# Patient Record
Sex: MALE | Race: BLACK OR AFRICAN AMERICAN | NOT HISPANIC OR LATINO
[De-identification: names, ages, dates, MRNs, and addresses within clinical notes are randomized per-mention and may not be internally consistent; named-entity substitution may affect disease eponyms.]

---

## 2021-01-25 PROBLEM — Z00.00 ENCOUNTER FOR PREVENTIVE HEALTH EXAMINATION: Status: ACTIVE | Noted: 2021-01-25

## 2021-01-26 ENCOUNTER — APPOINTMENT (OUTPATIENT)
Dept: OTOLARYNGOLOGY | Facility: CLINIC | Age: 26
End: 2021-01-26
Payer: COMMERCIAL

## 2021-01-26 VITALS
BODY MASS INDEX: 30.36 KG/M2 | HEART RATE: 76 BPM | DIASTOLIC BLOOD PRESSURE: 93 MMHG | SYSTOLIC BLOOD PRESSURE: 143 MMHG | WEIGHT: 205 LBS | HEIGHT: 69 IN

## 2021-01-26 DIAGNOSIS — R51.9 HEADACHE, UNSPECIFIED: ICD-10-CM

## 2021-01-26 DIAGNOSIS — R93.0 ABNORMAL FINDINGS ON DIAGNOSTIC IMAGING OF SKULL AND HEAD, NOT ELSEWHERE CLASSIFIED: ICD-10-CM

## 2021-01-26 PROCEDURE — 99072 ADDL SUPL MATRL&STAF TM PHE: CPT

## 2021-01-26 PROCEDURE — 99204 OFFICE O/P NEW MOD 45 MIN: CPT | Mod: 25

## 2021-01-26 PROCEDURE — 31231 NASAL ENDOSCOPY DX: CPT

## 2021-01-26 NOTE — DATA REVIEWED
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  Report reviewed unable to see films.

## 2021-01-26 NOTE — REASON FOR VISIT
[Initial Consultation] : an initial consultation for [FreeTextEntry2] : possible sinus infection. [FreeTextEntry1] : Referred by [facility] [place] [facility] Dr. Monahan

## 2021-01-26 NOTE — PHYSICAL EXAM
[Nasal Endoscopy Performed] : nasal endoscopy was performed, see procedure section for findings [] : septum deviated bilaterally [Normal] : mucosa is normal

## 2021-01-26 NOTE — HISTORY OF PRESENT ILLNESS
[de-identified] : 25-year-old male presented with a history of having seen by neurologist and referred to ENT.  Has  history of severe headaches frontal as well in the back of his head not related to any type of purulent nasal drainage.  Does have some clear drainage in the morning no history of difficulty breathing through his nose but does get occasional congestion.  Has not had any purulent sinusitis.  Undergone work-up from the neurologist for severe headaches affecting his eyeballs back of the head and forehead.  Findings were suspicious of migraine/cluster headaches.  Underwent an MRI which showed abnormal sinus disease thus the referral to ENT.  Being followed by neurologist getting some injections for the pain

## 2021-01-26 NOTE — CONSULT LETTER
[Dear  ___] : Dear  [unfilled], [Consult Letter:] : I had the pleasure of evaluating your patient, [unfilled]. [Please see my note below.] : Please see my note below. [Consult Closing:] : Thank you very much for allowing me to participate in the care of this patient.  If you have any questions, please do not hesitate to contact me. [Sincerely,] : Sincerely, [FreeTextEntry3] : Erik Pinto MD, FACS \par  of Otolaryngology  \par [facility] \par [address] \[place] [address] \par Phone: [phone] \par Fax: [phone] \par \par

## 2021-01-26 NOTE — REVIEW OF SYSTEMS
[Ear Pain] : ear pain [Ear Itch] : ear itch [Lightheadedness] : lightheadedness [Sinus Pain] : sinus pain [Sinus Pressure] : sinus pressure [Eye Pain] : eye pain [Dizziness] : dizziness [Negative] : Heme/Lymph [Anxiety] : no anxiety [de-identified] : Headaches

## 2022-02-04 ENCOUNTER — EMERGENCY (EMERGENCY)
Facility: HOSPITAL | Age: 27
LOS: 1 days | Discharge: HOME | End: 2022-02-04
Attending: EMERGENCY MEDICINE | Admitting: EMERGENCY MEDICINE
Payer: COMMERCIAL

## 2022-02-04 VITALS
TEMPERATURE: 98 F | RESPIRATION RATE: 17 BRPM | HEART RATE: 100 BPM | SYSTOLIC BLOOD PRESSURE: 155 MMHG | OXYGEN SATURATION: 100 % | DIASTOLIC BLOOD PRESSURE: 104 MMHG

## 2022-02-04 VITALS
HEART RATE: 100 BPM | DIASTOLIC BLOOD PRESSURE: 68 MMHG | OXYGEN SATURATION: 100 % | TEMPERATURE: 98 F | RESPIRATION RATE: 18 BRPM | SYSTOLIC BLOOD PRESSURE: 144 MMHG

## 2022-02-04 PROCEDURE — 99284 EMERGENCY DEPT VISIT MOD MDM: CPT

## 2022-02-04 RX ORDER — IBUPROFEN 200 MG
600 TABLET ORAL ONCE
Refills: 0 | Status: COMPLETED | OUTPATIENT
Start: 2022-02-04 | End: 2022-02-04

## 2022-02-04 RX ORDER — METOCLOPRAMIDE HCL 10 MG
10 TABLET ORAL ONCE
Refills: 0 | Status: COMPLETED | OUTPATIENT
Start: 2022-02-04 | End: 2022-02-04

## 2022-02-04 RX ORDER — ACETAMINOPHEN 500 MG
975 TABLET ORAL ONCE
Refills: 0 | Status: COMPLETED | OUTPATIENT
Start: 2022-02-04 | End: 2022-02-04

## 2022-02-04 RX ORDER — MORPHINE SULFATE 50 MG/1
4 CAPSULE, EXTENDED RELEASE ORAL ONCE
Refills: 0 | Status: DISCONTINUED | OUTPATIENT
Start: 2022-02-04 | End: 2022-02-04

## 2022-02-04 RX ORDER — OXYCODONE HYDROCHLORIDE 5 MG/1
1 TABLET ORAL
Qty: 12 | Refills: 0
Start: 2022-02-04 | End: 2022-02-06

## 2022-02-04 RX ORDER — LIDOCAINE 4 G/100G
1 CREAM TOPICAL ONCE
Refills: 0 | Status: COMPLETED | OUTPATIENT
Start: 2022-02-04 | End: 2022-02-04

## 2022-02-04 RX ORDER — OXYCODONE HYDROCHLORIDE 5 MG/1
5 TABLET ORAL ONCE
Refills: 0 | Status: DISCONTINUED | OUTPATIENT
Start: 2022-02-04 | End: 2022-02-04

## 2022-02-04 RX ADMIN — Medication 10 MILLIGRAM(S): at 18:51

## 2022-02-04 RX ADMIN — LIDOCAINE 1 PATCH: 4 CREAM TOPICAL at 15:50

## 2022-02-04 RX ADMIN — Medication 975 MILLIGRAM(S): at 15:50

## 2022-02-04 RX ADMIN — OXYCODONE HYDROCHLORIDE 5 MILLIGRAM(S): 5 TABLET ORAL at 15:50

## 2022-02-04 RX ADMIN — MORPHINE SULFATE 4 MILLIGRAM(S): 50 CAPSULE, EXTENDED RELEASE ORAL at 18:51

## 2022-02-04 RX ADMIN — Medication 600 MILLIGRAM(S): at 16:12

## 2022-02-04 NOTE — ED PROVIDER NOTE - CLINICAL SUMMARY MEDICAL DECISION MAKING FREE TEXT BOX
25yo M w/ no significant pmh coming in w/ worsening atraumatic back pain since yesterday; no corcern for cord compression. Will treat symptomatically and reassess

## 2022-02-04 NOTE — ED ADULT NURSE REASSESSMENT NOTE - NS ED NURSE REASSESS COMMENT FT1
pt alert,oriented x3. reports headache strted  followed by back pains, states back pain affected by mvt. requesting to  speak with md. md informed. will continue to monitor

## 2022-02-04 NOTE — ED PROVIDER NOTE - PROGRESS NOTE DETAILS
DD ED ATTG:  still having pain, appears more comfortable but reports not able to walk.  Also reports HA.  Pt endorses gets HA from time to time, sees neuro, had CT Ciaran, PGY2: Patient reassessed after medications and is noting symptomatic relief. Patient able to stand and walk. Informed patient that pain will take a bit to completely resolve and recommended outpatient spine follow up. Patient is agreeable and feels comfortable for discharge. Strict return precautions provided.

## 2022-02-04 NOTE — ED PROVIDER NOTE - OBJECTIVE STATEMENT
25yo M w/ no significant pmh coming in w/ worsening atraumatic back pain since yesterday. 25yo M w/ no significant pmh coming in w/ worsening atraumatic back pain since yesterday. Denies any urinary or bowel incontinence, any fevers/chills or loss of sensation to the lower extremities. Took tylenol last night w/ minimal relief.

## 2022-02-04 NOTE — ED PROVIDER NOTE - ATTENDING CONTRIBUTION TO CARE
26M p/w low back pain x 1 day, unable to walk, "severe" "unbearable".  No injury or inciting event pt can recall.  Pain is better at rest.  Pt tried tylenol last night w/o much improvement.  Pain located low back, diffuse across the midline.  Aching pain.  No urinary or fecal incontinence.  Pt reports both legs have numbness.  PMHX deneis  PSHX denies no meds.  (+)T no drugs.  Pt also reports has chronic HA, sees neuro, apparently had an abnormal head CT and was recc to get MRI but hasn't yet.  Pt mild distress at rest, worse upon movement, hip flexion.  Able to turn over in bed.  No midline spinal ttp or deformity.  Some PVM spasm.  Distal feet motor strength 5/5, no sensory def.  Normal neuro exam.  Plan rx pain meds, reass.  Trial of ambulation.    VS:  unremarkable except HTN    GEN - mild-mod distress back pain, worse with movement   A+O x3   HEAD - NC/AT     ENT - PEERL, EOMI, mucous membranes    moist , no discharge      NECK: Neck supple, non-tender without lymphadenopathy, no masses, no JVD  PULM - CTA b/l,  symmetric breath sounds  COR -  normal heart sounds    ABD - , ND, NT, soft,  BACK - no CVA tenderness, nontender spine   No focal midline ttp of L-spine.  No skin change or deformity.  (+)PVM spasm bilat lumbar area.    EXTREMS - no edema, no deformity, warm and well perfused    SKIN - no rash    or bruising      NEUROLOGIC - alert, face symmetric, speech fluent, sensation nl, motor no focal deficit.

## 2022-02-04 NOTE — ED PROVIDER NOTE - NSFOLLOWUPINSTRUCTIONS_ED_ALL_ED_FT
Discharge instructions:    - Please follow up with your Primary Care Doctor within the next 3-5 days.    - We recommend you follow up with the [facility]ine [facility].     - Tylenol up to 650 mg every 8 hours as needed for pain and/or Motrin up to 600 mg every 6 hours as needed for pain. Take any prescribed medications as instructed:         SEEK IMMEDIATE MEDICAL CARE IF YOU HAVE ANY OF THE FOLLOWING SYMPTOMS: bowel or bladder control problems, unusual weakness or numbness in your arms or legs, nausea or vomiting, abdominal pain, fever, dizziness/lightheadedness.

## 2022-02-04 NOTE — ED PROVIDER NOTE - PHYSICAL EXAMINATION
GENERAL: well appearing in no acute distress, non-toxic appearing  HEAD: normocephalic, atraumatic  HENT: airway intact, neck supple  EYES: normal conjunctiva  CARDIAC: regular rate and rhythm, normal S1S2, no appreciable murmurs, 2+ pulses in UE/LE b/l  PULM: normal breath sounds, clear to ascultation bilaterally, no rales, rhonchi, wheezing  GI: abdomen nondistended, soft, nontender, no guarding, rebound tenderness  NEURO: no focal motor or sensory deficits, CN2-12 intact, normal speech, AAOx3  MSK: lumbar paraspinal TTP; 5/5 strength in lower extremities; sensation intact, intact proprioception   SKIN: well-perfused, extremities warm  PSYCH: appropriate mood and affect

## 2022-02-04 NOTE — ED ADULT NURSE REASSESSMENT NOTE - NS ED NURSE REASSESS COMMENT FT1
pt remains alert,oriented  x 3. iv access,meds as ordered c/o headache,back pains after reevaluation by md. denies n,v.. back pains increases with mvt. will continue to monitor

## 2022-02-04 NOTE — ED PROVIDER NOTE - PATIENT PORTAL LINK FT
You can access the FollowMyHealth Patient Portal offered by [facility] by registering at the following website: [email or web address]. By joining [employer]’s FollowMyHealth portal, you will also be able to view your health information using other applications (apps) compatible with our system.

## 2022-02-04 NOTE — ED PROVIDER NOTE - NS ED ROS FT
General: denies fever, chills  HENT: denies nasal congestion, rhinorrhea  Eyes: denies visual changes, blurred vision  CV: denies chest pain, palpitations  Resp: denies difficulty breathing, cough  Abdominal: denies nausea, vomiting, diarrhea, abdominal pain  : denies urinary pain or discharge  MSK: lower back pain  Neuro: denies headaches, numbness, tingling  Skin: denies rashes, bruises

## 2024-04-02 ENCOUNTER — EMERGENCY (EMERGENCY)
Facility: HOSPITAL | Age: 29
LOS: 1 days | Discharge: HOME | End: 2024-04-02
Attending: EMERGENCY MEDICINE | Admitting: STUDENT IN AN ORGANIZED HEALTH CARE EDUCATION/TRAINING PROGRAM
Payer: COMMERCIAL

## 2024-04-02 VITALS
TEMPERATURE: 98 F | DIASTOLIC BLOOD PRESSURE: 77 MMHG | OXYGEN SATURATION: 99 % | RESPIRATION RATE: 18 BRPM | HEART RATE: 99 BPM | SYSTOLIC BLOOD PRESSURE: 126 MMHG

## 2024-04-02 DIAGNOSIS — Z98.890 OTHER SPECIFIED POSTPROCEDURAL STATES: Chronic | ICD-10-CM

## 2024-04-02 LAB
ALBUMIN SERPL ELPH-MCNC: 4.2 G/DL — SIGNIFICANT CHANGE UP (ref 3.3–5)
ALP SERPL-CCNC: 68 U/L — SIGNIFICANT CHANGE UP (ref 40–120)
ALT FLD-CCNC: 29 U/L — SIGNIFICANT CHANGE UP (ref 4–41)
ANION GAP SERPL CALC-SCNC: 13 MMOL/L — SIGNIFICANT CHANGE UP (ref 7–14)
AST SERPL-CCNC: 19 U/L — SIGNIFICANT CHANGE UP (ref 4–40)
BASE EXCESS BLDV CALC-SCNC: 3.7 MMOL/L — HIGH (ref -2–3)
BASOPHILS # BLD AUTO: 0.03 K/UL — SIGNIFICANT CHANGE UP (ref 0–0.2)
BASOPHILS NFR BLD AUTO: 0.1 % — SIGNIFICANT CHANGE UP (ref 0–2)
BILIRUB SERPL-MCNC: 1.1 MG/DL — SIGNIFICANT CHANGE UP (ref 0.2–1.2)
BLOOD GAS VENOUS COMPREHENSIVE RESULT: SIGNIFICANT CHANGE UP
BUN SERPL-MCNC: 9 MG/DL — SIGNIFICANT CHANGE UP (ref 7–23)
CALCIUM SERPL-MCNC: 9.5 MG/DL — SIGNIFICANT CHANGE UP (ref 8.4–10.5)
CHLORIDE BLDV-SCNC: 99 MMOL/L — SIGNIFICANT CHANGE UP (ref 96–108)
CHLORIDE SERPL-SCNC: 100 MMOL/L — SIGNIFICANT CHANGE UP (ref 98–107)
CO2 BLDV-SCNC: 31.2 MMOL/L — HIGH (ref 22–26)
CO2 SERPL-SCNC: 25 MMOL/L — SIGNIFICANT CHANGE UP (ref 22–31)
CREAT SERPL-MCNC: 0.91 MG/DL — SIGNIFICANT CHANGE UP (ref 0.5–1.3)
EGFR: 118 ML/MIN/1.73M2 — SIGNIFICANT CHANGE UP
EOSINOPHIL # BLD AUTO: 0.15 K/UL — SIGNIFICANT CHANGE UP (ref 0–0.5)
EOSINOPHIL NFR BLD AUTO: 0.7 % — SIGNIFICANT CHANGE UP (ref 0–6)
GAS PNL BLDV: 134 MMOL/L — LOW (ref 136–145)
GAS PNL BLDV: SIGNIFICANT CHANGE UP
GLUCOSE BLDV-MCNC: 91 MG/DL — SIGNIFICANT CHANGE UP (ref 70–99)
GLUCOSE SERPL-MCNC: 82 MG/DL — SIGNIFICANT CHANGE UP (ref 70–99)
HCO3 BLDV-SCNC: 30 MMOL/L — HIGH (ref 22–29)
HCT VFR BLD CALC: 41.4 % — SIGNIFICANT CHANGE UP (ref 39–50)
HCT VFR BLDA CALC: 43 % — SIGNIFICANT CHANGE UP (ref 39–51)
HGB BLD CALC-MCNC: 14.2 G/DL — SIGNIFICANT CHANGE UP (ref 12.6–17.4)
HGB BLD-MCNC: 14.1 G/DL — SIGNIFICANT CHANGE UP (ref 13–17)
HIV 1+2 AB+HIV1 P24 AG SERPL QL IA: SIGNIFICANT CHANGE UP
IANC: 15.69 K/UL — HIGH (ref 1.8–7.4)
IMM GRANULOCYTES NFR BLD AUTO: 0.4 % — SIGNIFICANT CHANGE UP (ref 0–0.9)
LACTATE BLDV-MCNC: 2 MMOL/L — SIGNIFICANT CHANGE UP (ref 0.5–2)
LYMPHOCYTES # BLD AUTO: 16 % — SIGNIFICANT CHANGE UP (ref 13–44)
LYMPHOCYTES # BLD AUTO: 3.29 K/UL — SIGNIFICANT CHANGE UP (ref 1–3.3)
MCHC RBC-ENTMCNC: 29.1 PG — SIGNIFICANT CHANGE UP (ref 27–34)
MCHC RBC-ENTMCNC: 34.1 GM/DL — SIGNIFICANT CHANGE UP (ref 32–36)
MCV RBC AUTO: 85.5 FL — SIGNIFICANT CHANGE UP (ref 80–100)
MONOCYTES # BLD AUTO: 1.27 K/UL — HIGH (ref 0–0.9)
MONOCYTES NFR BLD AUTO: 6.2 % — SIGNIFICANT CHANGE UP (ref 2–14)
NEUTROPHILS # BLD AUTO: 15.69 K/UL — HIGH (ref 1.8–7.4)
NEUTROPHILS NFR BLD AUTO: 76.6 % — SIGNIFICANT CHANGE UP (ref 43–77)
NRBC # BLD: 0 /100 WBCS — SIGNIFICANT CHANGE UP (ref 0–0)
NRBC # FLD: 0 K/UL — SIGNIFICANT CHANGE UP (ref 0–0)
PCO2 BLDV: 49 MMHG — SIGNIFICANT CHANGE UP (ref 42–55)
PH BLDV: 7.39 — SIGNIFICANT CHANGE UP (ref 7.32–7.43)
PLATELET # BLD AUTO: 247 K/UL — SIGNIFICANT CHANGE UP (ref 150–400)
PO2 BLDV: <20 MMHG — LOW (ref 25–45)
POTASSIUM BLDV-SCNC: 3.3 MMOL/L — LOW (ref 3.5–5.1)
POTASSIUM SERPL-MCNC: 3.4 MMOL/L — LOW (ref 3.5–5.3)
POTASSIUM SERPL-SCNC: 3.4 MMOL/L — LOW (ref 3.5–5.3)
PROT SERPL-MCNC: 7.6 G/DL — SIGNIFICANT CHANGE UP (ref 6–8.3)
RBC # BLD: 4.84 M/UL — SIGNIFICANT CHANGE UP (ref 4.2–5.8)
RBC # FLD: 13.6 % — SIGNIFICANT CHANGE UP (ref 10.3–14.5)
SAO2 % BLDV: 31.2 % — LOW (ref 67–88)
SODIUM SERPL-SCNC: 138 MMOL/L — SIGNIFICANT CHANGE UP (ref 135–145)
WBC # BLD: 20.51 K/UL — HIGH (ref 3.8–10.5)
WBC # FLD AUTO: 20.51 K/UL — HIGH (ref 3.8–10.5)

## 2024-04-02 PROCEDURE — 99285 EMERGENCY DEPT VISIT HI MDM: CPT

## 2024-04-02 PROCEDURE — 74177 CT ABD & PELVIS W/CONTRAST: CPT | Mod: 26,MC

## 2024-04-02 RX ORDER — LIDOCAINE HYDROCHLORIDE AND EPINEPHRINE 10; 10 MG/ML; UG/ML
20 INJECTION, SOLUTION INFILTRATION; PERINEURAL ONCE
Refills: 0 | Status: COMPLETED | OUTPATIENT
Start: 2024-04-02 | End: 2024-04-02

## 2024-04-02 RX ORDER — KETOROLAC TROMETHAMINE 30 MG/ML
15 SYRINGE (ML) INJECTION ONCE
Refills: 0 | Status: DISCONTINUED | OUTPATIENT
Start: 2024-04-02 | End: 2024-04-02

## 2024-04-02 RX ORDER — ACETAMINOPHEN 500 MG
1000 TABLET ORAL ONCE
Refills: 0 | Status: COMPLETED | OUTPATIENT
Start: 2024-04-02 | End: 2024-04-02

## 2024-04-02 RX ORDER — HYDROMORPHONE HYDROCHLORIDE 2 MG/ML
1 INJECTION INTRAMUSCULAR; INTRAVENOUS; SUBCUTANEOUS ONCE
Refills: 0 | Status: DISCONTINUED | OUTPATIENT
Start: 2024-04-02 | End: 2024-04-02

## 2024-04-02 RX ADMIN — Medication 15 MILLIGRAM(S): at 20:19

## 2024-04-02 RX ADMIN — Medication 400 MILLIGRAM(S): at 20:19

## 2024-04-02 RX ADMIN — LIDOCAINE HYDROCHLORIDE AND EPINEPHRINE 20 MILLILITER(S): 10; 10 INJECTION, SOLUTION INFILTRATION; PERINEURAL at 23:04

## 2024-04-02 RX ADMIN — HYDROMORPHONE HYDROCHLORIDE 1 MILLIGRAM(S): 2 INJECTION INTRAMUSCULAR; INTRAVENOUS; SUBCUTANEOUS at 23:01

## 2024-04-02 RX ADMIN — Medication 100 MILLIGRAM(S): at 20:39

## 2024-04-02 NOTE — CONSULT NOTE ADULT - ASSESSMENT
28M w/ no PMH presents with perianal abscess, with low concern for sepsis or systemic infection.    -Incision and drainage to be done at bedside  -Packing to be placed, can be removed tomorrow by patient  -Wound cx to be sent  -Patient can f/u in clinic with attending cosigned to note    D/w Chief Resident on call    Polina Montalvo MD  PGY-3  A Team  28M w/ no PMH presents with perianal abscess, with low concern for sepsis or systemic infection.    -Incision and drainage done at bedside  -Packing placed, can be removed tomorrow by patient  -Wound cx sent, f/u results  -Multimodal pain control on discharge with Tylenol, Motrin, and Oxycodone  -Stool softener  -Patient can f/u in clinic with Dr. Hernandez    D/w Chief Resident on call    Polina Montalvo MD  PGY-3  A Team  28M w/ no PMH presents with perianal abscess, with low concern for sepsis or systemic infection.    -Incision and drainage done at bedside succesfully  -Packing placed, can be removed tomorrow by patient  -Wound cx sent, f/u results  -Multimodal pain control on discharge with Tylenol, Motrin, and Oxycodone  -Stool softener  -Patient can f/u in clinic with Dr. Hernandez    D/w Chief Resident on call    Polina Montalvo MD  PGY-3  A Team

## 2024-04-02 NOTE — ED PROVIDER NOTE - OBJECTIVE STATEMENT
28-year-old male with history of labral laminectomy  sent in by urgent care for possible I&D for anal abscess.  Patient states since 3 days ago he has been experiencing severe pain around the anus,  leading to the him having difficulty sitting.  He felt a bump near the anus.  Report fever to 102.4 at home.  Denies nausea vomiting, abdominal pain, chest pain, shortness of breath, headache.  Went to urgent care today and was told that he needs an I&D performed for possible anal abscess. 28-year-old male with history of labral laminectomy  sent in by urgent care for possible I&D for anal abscess.  Patient states since 3 days ago he has been experiencing severe pain around the anus,  leading to the him having difficulty sitting.  He felt a bump near the anus.  Report fever to 102.4 at home.  Denies nausea vomiting, abdominal pain, chest pain, shortness of breath, headache.  Went to urgent care today and was told that he needs an I&D performed for possible anal abscess. Patient notes anal sex around 1 month ago.  Would like to be tested for STDs.

## 2024-04-02 NOTE — ED PROVIDER NOTE - ATTENDING CONTRIBUTION TO CARE
chante: Patient 28-year-old man otherwise healthy denies any other past medical history.  Has had perirectal pain for at least the last 3 days.  Nothing recently inserted into the rectum.  Last time the intercourse was about a month ago.  No history of an abscess previously in a perirectal or rectal area went to urgent care today found that he had an abscess that needed an I&D most likely was sent to the emergency department at home he had fever of 102.4.    Denies nausea vomiting diarrhea abdominal pain shortness of breath.    Blood pressure 126/77 respiratory rate 18 heart rate 99 temperature oral 98  Pt alert and can phonate well  h at/nc  perrl, conj clear, sclera anicteric,  neck supple  abd soft no r/g/t  Rectum on left buttock there is an area of remarkable tenderness and fluctuance in the vicinity of the anus but on rectal exam it does not extend into the anus.  ext no edema no deformities  neueo awake, lucid normal gait moves all extremities with strength  psych normal affect    Patient will have a CAT scan.  Area seems to be quite large had fever at home and doing a rectal temp here having lab work and will need medications and would start antibiotics as well    I performed a history and physical exam of the patient and discussed their management with the resident and /or advanced care provider. I personally made/approved the management plan and take responsibility for the patient management. I reviewed the resident and /or ACP's note and agree with the documented findings and plan of care. My medical decison making and observations are found above.

## 2024-04-02 NOTE — PROCEDURE NOTE - ADDITIONAL PROCEDURE DETAILS
Consent obtained. 1% Lidocaine w/ epi 10cc used for local anesthetic. Using 18g needle, aspirated into area of maximal induration and fluctuation, yielding 5cc pus. We then made an elliptical incision directly onto this needle juanis, with free flow of pus. We blunty explored the cavity and broke up loculations which yielded further pus. Packing was placed into the wound. Patient tolerated well. Consent obtained. 1% Lidocaine w/ epi 10cc used for local anesthetic. Using 18g needle, aspirated into area of maximal induration and fluctuation, yielding 5cc pus. We then made an elliptical incision directly onto this needle juanis, with free flow of pus. We blunty explored the cavity and broke up loculations which yielded further pus. Packing was placed into the wound and dressing applied. Patient tolerated well.

## 2024-04-02 NOTE — ED PROVIDER NOTE - CLINICAL SUMMARY MEDICAL DECISION MAKING FREE TEXT BOX
Presentation concerning for anal rectal abscess with possible fistula.  Given subjective fever, will obtain rectal temperature.  Obtain labs, pain control. CT abdomen pelvis with IV contrast to further assess.  Will reassess, dispo pending workup. Presentation concerning for anal rectal abscess with possible fistula.  Given subjective fever, will obtain rectal temperature.  Obtain labs, pain control. CT abdomen pelvis with IV contrast to further assess.  Will reassess, dispo pending workup.    chante: Patient 28-year-old man otherwise healthy denies any other past medical history.  Has had perirectal pain for at least the last 3 days.  Nothing recently inserted into the rectum.  Last time the intercourse was about a month ago.  No history of an abscess previously in a perirectal or rectal area went to urgent care today found that he had an abscess that needed an I&D most likely was sent to the emergency department at home he had fever of 102.4.    Denies nausea vomiting diarrhea abdominal pain shortness of breath.    Blood pressure 126/77 respiratory rate 18 heart rate 99 temperature oral 98  Pt alert and can phonate well  h at/nc  perrl, conj clear, sclera anicteric,  neck supple  abd soft no r/g/t  Rectum on left buttock there is an area of remarkable tenderness and fluctuance in the vicinity of the anus but on rectal exam it does not extend into the anus.  ext no edema no deformities  neueo awake, lucid normal gait moves all extremities with strength  psych normal affect    Patient will have a CAT scan.  Area seems to be quite large had fever at home and doing a rectal temp here having lab work and will need medications and would start antibiotics as well

## 2024-04-02 NOTE — CONSULT NOTE ADULT - SUBJECTIVE AND OBJECTIVE BOX
HPI: 28M no PMH presents w/ 3d pain in the rectal region.    In the ED, HDS. Labs with WBC 21, Lactate 2. CT A/P w/ IV showing 4cm perianal abscess. The patient endorses he felt febrile at home but this resolved. Denies chills, nausea, vomiting. Endorses this has never happened before. Has not had drainage of pus or blood from below. Denies colonoscopy hx of hx of IBD, large weight loss.    PMH: As above    PSH: As above    Allergies: NKDA    Physical exam:    /73  SPO2 100 T 98.2 RR 17    General- NAD. Pleasant and responsive. Non toxic appearing  Lungs- Comfortable on room air  - Area of induration and swelling on inferior gluteal fold, L side. Tender to palpation. No drainage. No hemorrhoids noted, or ulceration    Imaging:    ACC: [id number] EXAM:  CT ABDOMEN AND PELVIS IC   ORDERED BY: DEXTER MACHADO     PROCEDURE DATE:  04/02/2024          INTERPRETATION:  CLINICAL INFORMATION: Rectal abscess.    COMPARISON: None.    CONTRAST/COMPLICATIONS:  IV Contrast: Omnipaque 350  90 cc administered   10 cc discarded  Oral Contrast: NONE  Complications: None reported at time of study completion    PROCEDURE:  CT of the Abdomen and Pelvis was performed.  Sagittal and coronal reformats were performed.    FINDINGS:  LOWER CHEST: Within normal limits.    LIVER: Within normal limits.  BILE DUCTS: Normal caliber.  GALLBLADDER: Within normal limits.  SPLEEN: Within normal limits.  PANCREAS: Within normal limits.  ADRENALS: Within normal limits.  KIDNEYS/URETERS: Within normal limits.    BLADDER: Within normal limits.  REPRODUCTIVE ORGANS: Prostate within normal limits.    BOWEL: No bowel obstruction. Appendix is normal.  PERITONEUM: No ascites.  VESSELS: Within normal limits.  RETROPERITONEUM/LYMPH NODES: A few nonenlarged clusters of lymph nodes   are seen in the mesentery, nonspecific  ABDOMINAL WALL: 4.2 x 2.6 x 4.0 cm collection within the left posterior   perianal region with surrounding inflammation. Tiny fat-containing   umbilical hernia.  BONES: Within normal limits.    IMPRESSION:    4.2 x 2.6 x 4.0 cm left perianal collection with surrounding   inflammation. More detailed evaluation with contrast-enhanced MRI may be   obtained, as clinically warranted, if no contraindications exist.    --- End of Report ---          RADHA STRINGER MD; Resident Radiologist  This document has been electronically signed.  LONG MESA MD; Attending Radiologist  This document has been electronically signed. Apr 2 2024  9:55PM

## 2024-04-02 NOTE — ED ADULT NURSE NOTE - OBJECTIVE STATEMENT
this is a 28 y/0 male complaining of pain in his buttocks. Pain is 9/10. Alert and oriented x 4, denies past medical history. Rectal temp is 102.8 Dr Vernon informed. No bleeding noted, , ? boil on the inside of his buttocks, non draining firm on palpation. IV initiated on left arm g 20, 1st set of blood culture drawn and sent to lab.

## 2024-04-02 NOTE — ED ADULT TRIAGE NOTE - CHIEF COMPLAINT QUOTE
Pt AOX4 c/o  rectal pain, was seen in urgi-care told he has an abscess and to go to ER for I&D, no bleeding  Hx of abscesses under his arm but has never needed I&D   Hx of spinal decompression surgery

## 2024-04-02 NOTE — ED PROVIDER NOTE - PHYSICAL EXAMINATION
Vital signs reviewed.  CONSTITUTIONAL: NAD, awake  HEAD: Normocephalic; atraumatic  EYES: EOMI, no conjunctival injection, no scleral icterus  MOUTH/THROAT:  MMM  CV: Normal S1, S2; no audible murmurs; extremities WWP  RESP: normal work of breathing; CTAB   ABD: soft, non-distended; non-tender  : ***  MSK/EXT: no edema, no limited ROM  SKIN: No rashes on exposed skin surfaces  NEURO: Moves all extremities spontaneously with no focal deficits, speech is appropriate Vital signs reviewed.  CONSTITUTIONAL: NAD, awake  HEAD: Normocephalic; atraumatic  EYES: EOMI, no conjunctival injection, no scleral icterus  MOUTH/THROAT:  MMM  CV: Normal S1, S2; no audible murmurs; extremities WWP  RESP: normal work of breathing; CTAB   ABD: soft, non-distended; non-tender  : rectal exam showed approximately 5x5cm erythematous area postero-lateral to anus, indurated. White point near anus, possible fistula.   MSK/EXT: no edema, no limited ROM  SKIN: No rashes on exposed skin surfaces  NEURO: Moves all extremities spontaneously with no focal deficits, speech is appropriate

## 2024-04-03 VITALS
DIASTOLIC BLOOD PRESSURE: 81 MMHG | RESPIRATION RATE: 18 BRPM | HEART RATE: 83 BPM | SYSTOLIC BLOOD PRESSURE: 119 MMHG | OXYGEN SATURATION: 100 % | TEMPERATURE: 98 F

## 2024-04-03 LAB
ADD ON TEST-SPECIMEN IN LAB: SIGNIFICANT CHANGE UP
ALBUMIN SERPL ELPH-MCNC: 3.8 G/DL — SIGNIFICANT CHANGE UP (ref 3.3–5)
ALP SERPL-CCNC: 81 U/L — SIGNIFICANT CHANGE UP (ref 40–120)
ALT FLD-CCNC: 30 U/L — SIGNIFICANT CHANGE UP (ref 4–41)
ANION GAP SERPL CALC-SCNC: 19 MMOL/L — HIGH (ref 7–14)
AST SERPL-CCNC: 24 U/L — SIGNIFICANT CHANGE UP (ref 4–40)
BASOPHILS # BLD AUTO: 0.04 K/UL — SIGNIFICANT CHANGE UP (ref 0–0.2)
BASOPHILS NFR BLD AUTO: 0.2 % — SIGNIFICANT CHANGE UP (ref 0–2)
BILIRUB SERPL-MCNC: 0.7 MG/DL — SIGNIFICANT CHANGE UP (ref 0.2–1.2)
BUN SERPL-MCNC: 13 MG/DL — SIGNIFICANT CHANGE UP (ref 7–23)
CALCIUM SERPL-MCNC: 9.2 MG/DL — SIGNIFICANT CHANGE UP (ref 8.4–10.5)
CHLORIDE SERPL-SCNC: 98 MMOL/L — SIGNIFICANT CHANGE UP (ref 98–107)
CO2 SERPL-SCNC: 20 MMOL/L — LOW (ref 22–31)
CREAT SERPL-MCNC: 0.97 MG/DL — SIGNIFICANT CHANGE UP (ref 0.5–1.3)
EGFR: 110 ML/MIN/1.73M2 — SIGNIFICANT CHANGE UP
EOSINOPHIL # BLD AUTO: 0.29 K/UL — SIGNIFICANT CHANGE UP (ref 0–0.5)
EOSINOPHIL NFR BLD AUTO: 1.5 % — SIGNIFICANT CHANGE UP (ref 0–6)
GLUCOSE SERPL-MCNC: 107 MG/DL — HIGH (ref 70–99)
HAV IGM SER-ACNC: SIGNIFICANT CHANGE UP
HBV CORE IGM SER-ACNC: SIGNIFICANT CHANGE UP
HBV SURFACE AB SER-ACNC: REACTIVE
HBV SURFACE AG SER-ACNC: SIGNIFICANT CHANGE UP
HBV SURFACE AG SER-ACNC: SIGNIFICANT CHANGE UP
HCT VFR BLD CALC: 42.6 % — SIGNIFICANT CHANGE UP (ref 39–50)
HCV AB S/CO SERPL IA: 0.08 S/CO — SIGNIFICANT CHANGE UP (ref 0–0.99)
HCV AB SERPL-IMP: SIGNIFICANT CHANGE UP
HGB BLD-MCNC: 14 G/DL — SIGNIFICANT CHANGE UP (ref 13–17)
IANC: 14.51 K/UL — HIGH (ref 1.8–7.4)
IMM GRANULOCYTES NFR BLD AUTO: 0.5 % — SIGNIFICANT CHANGE UP (ref 0–0.9)
LYMPHOCYTES # BLD AUTO: 17 % — SIGNIFICANT CHANGE UP (ref 13–44)
LYMPHOCYTES # BLD AUTO: 3.35 K/UL — HIGH (ref 1–3.3)
MCHC RBC-ENTMCNC: 28.9 PG — SIGNIFICANT CHANGE UP (ref 27–34)
MCHC RBC-ENTMCNC: 32.9 GM/DL — SIGNIFICANT CHANGE UP (ref 32–36)
MCV RBC AUTO: 87.8 FL — SIGNIFICANT CHANGE UP (ref 80–100)
MONOCYTES # BLD AUTO: 1.42 K/UL — HIGH (ref 0–0.9)
MONOCYTES NFR BLD AUTO: 7.2 % — SIGNIFICANT CHANGE UP (ref 2–14)
NEUTROPHILS # BLD AUTO: 14.51 K/UL — HIGH (ref 1.8–7.4)
NEUTROPHILS NFR BLD AUTO: 73.6 % — SIGNIFICANT CHANGE UP (ref 43–77)
NRBC # BLD: 0 /100 WBCS — SIGNIFICANT CHANGE UP (ref 0–0)
NRBC # FLD: 0 K/UL — SIGNIFICANT CHANGE UP (ref 0–0)
PLATELET # BLD AUTO: 264 K/UL — SIGNIFICANT CHANGE UP (ref 150–400)
POTASSIUM SERPL-MCNC: 3.4 MMOL/L — LOW (ref 3.5–5.3)
POTASSIUM SERPL-SCNC: 3.4 MMOL/L — LOW (ref 3.5–5.3)
PROT SERPL-MCNC: 7.2 G/DL — SIGNIFICANT CHANGE UP (ref 6–8.3)
RBC # BLD: 4.85 M/UL — SIGNIFICANT CHANGE UP (ref 4.2–5.8)
RBC # FLD: 13.6 % — SIGNIFICANT CHANGE UP (ref 10.3–14.5)
SODIUM SERPL-SCNC: 137 MMOL/L — SIGNIFICANT CHANGE UP (ref 135–145)
WBC # BLD: 19.7 K/UL — HIGH (ref 3.8–10.5)
WBC # FLD AUTO: 19.7 K/UL — HIGH (ref 3.8–10.5)

## 2024-04-03 PROCEDURE — 99235 HOSP IP/OBS SAME DATE MOD 70: CPT

## 2024-04-03 RX ORDER — POTASSIUM CHLORIDE 20 MEQ
40 PACKET (EA) ORAL ONCE
Refills: 0 | Status: COMPLETED | OUTPATIENT
Start: 2024-04-03 | End: 2024-04-03

## 2024-04-03 RX ORDER — ACETAMINOPHEN 500 MG
975 TABLET ORAL ONCE
Refills: 0 | Status: COMPLETED | OUTPATIENT
Start: 2024-04-03 | End: 2024-04-03

## 2024-04-03 RX ORDER — KETOROLAC TROMETHAMINE 30 MG/ML
30 SYRINGE (ML) INJECTION ONCE
Refills: 0 | Status: DISCONTINUED | OUTPATIENT
Start: 2024-04-03 | End: 2024-04-03

## 2024-04-03 RX ADMIN — Medication 30 MILLIGRAM(S): at 06:29

## 2024-04-03 RX ADMIN — Medication 100 MILLIGRAM(S): at 05:13

## 2024-04-03 RX ADMIN — HYDROMORPHONE HYDROCHLORIDE 1 MILLIGRAM(S): 2 INJECTION INTRAMUSCULAR; INTRAVENOUS; SUBCUTANEOUS at 03:32

## 2024-04-03 RX ADMIN — Medication 1000 MILLIGRAM(S): at 03:32

## 2024-04-03 RX ADMIN — Medication 600 MILLIGRAM(S): at 03:32

## 2024-04-03 RX ADMIN — Medication 40 MILLIEQUIVALENT(S): at 05:12

## 2024-04-03 RX ADMIN — Medication 975 MILLIGRAM(S): at 02:56

## 2024-04-03 RX ADMIN — Medication 15 MILLIGRAM(S): at 03:32

## 2024-04-03 NOTE — ED CDU PROVIDER DISPOSITION NOTE - NSFOLLOWUPINSTRUCTIONS_ED_ALL_ED_FT
Please follow up with Dr. Hernandez.     You may remove packing in site later today.     Take Clindamycin 1 tablet every 8 hours for 10 days.    Take Motrin 600mg every 8hrs with food for pain    Take Tylenol 650mg every 4-6 hours as needed for pain.     Please return for any worsening pain, fever, vomiting, or any other new or concerning symptoms     Anorectal Abscess  An abscess is an infected area that contains pus. An anorectal abscess is an abscess that forms near the opening of the butt (anus) or around the rectum. If it is not treated, the abscess can grow and cause other problems. These problems may include a more severe body-wide infection or pain, especially when you poop.    What are the causes?  An anorectal abscess is caused by clogged glands or an infection in:  The anus.  The area between the anus and the scrotum in males or between the anus and the vagina in females (perineum).  What increases the risk?  You may be more likely to get this condition if:  You are pregnant.  You have diabetes or an inflammatory bowel disease, such as Crohn's disease.  You have had anal sex.  You have certain cancers. These include rectal cancer, leukemia, or lymphoma.  You have been given medicines to kill cancer cells (chemotherapy).  You have cracks in your anus (anal fissures). These cracks may form if you have constipation that lasts for a long time or does not go away.  You have a sexually transmitted infection (STI).  What are the signs or symptoms?  The main symptom of this condition is pain. It may be a throbbing pain that gets worse when you poop. Other symptoms include:  Swelling, redness, and pus near the anus. The redness may go beyond the abscess and look like a red streak on the skin.  A painful lump or tissue near the anus.  Bleeding or pus-like discharge from the area.  Fever or night sweats.  Weakness and tiredness (fatigue).  Constipation or pain when pooping.  Pain in the lower part of your stomach.  How is this diagnosed?  This condition is diagnosed based on your medical history and a physical exam. The exam may include:  A digital rectal exam. This is when your health care provider puts a gloved finger into your anus and rectum to look for problems.  A vaginal exam. This is when your provider looks at the vagina for problems.  Using a tube with a light and camera on the end (scope) to look at the rectum.  You may also need tests, such as an MRI or CT scan.    How is this treated?  Treatment for this condition may include:  Incision and drainage of the abscess. This is when an incision is made over the abscess to drain the pus.  Medicines. These may include pain medicines, medicine to help you poop (laxatives), stool softeners, or antibiotics.  Follow these instructions at home:  Medicines    Take over-the-counter and prescription medicines only as told by your provider.  If you were prescribed antibiotics, use them as told by your provider. Do not stop using the antibiotic even if you start to feel better.  Ask your provider if the medicine prescribed to you requires you to avoid driving or using machinery.  Wound care    A normal puncture site compared to an infected puncture site. The infected puncture site has swelling and redness.  Follow instructions from your provider about how to take care of your wound. Make sure you:  Wash your hands with soap and water for at least 20 seconds before and after you change your bandage (dressing). If soap and water are not available, use hand .  Change your dressing as told by your provider.  Leave stitches (sutures), skin glue, or tape strips in place. These skin closures may need to stay in place for 2 weeks or longer. If tape strip edges start to loosen and curl up, you may trim the loose edges. Do not remove tape strips completely unless your provider tells you to do that.  If one or more tubes (drains) were placed to drain the pus, be careful not to pull at them. Your provider will tell you how long they need to stay in place.  Check your abscess area every day for signs of infection. Check for:  More redness, swelling, or pain.  More fluid or blood.  Warmth.  Pus or a bad smell.  Managing pain, stiffness, and swelling    Bag of ice on a towel on the skin.  To help with pain, try sitting:  On a heating pad with the setting on low.  On an inflatable donut-shaped cushion.  If told, put ice on the affected area.  Put ice in a plastic bag.  Place a towel between your skin and the bag.  Leave the ice on for 20 minutes, 2–3 times a day.  If your skin turns bright red, remove the ice right away to prevent skin damage. The risk of damage is higher if you cannot feel pain, heat, or cold.  General instructions    Take a sitz bath 3–4 times a day and after you poop. A sitz bath is a shallow, warm water bath that you sit down in. The water should only come up to your hips and should cover your butt.  Follow instructions from your provider about what you may eat and drink.  Keep all follow-up visits. Your provider may need to remove stitches and make sure that the abscess has gone away.  Where to find more information  [employer] Rect[employer]: fascrs.org  Contact a health care provider if:  You have any signs of an infection.  You have a fever or chills.  You have trouble pooping.  Get help right away if:  You have trouble moving or using your legs.  You have severe pain or pain that gets worse.  You have swelling that gets worse.  You have a lot more bleeding or passing of pus.  This information is not intended to replace advice given to you by your health care provider. Make sure you discuss any questions you have with your health care provider.

## 2024-04-03 NOTE — ED CDU PROVIDER DISPOSITION NOTE - CLINICAL COURSE
28-year-old male with history of labral laminectomy  sent in by urgent care for possible I&D for anal abscess.  Patient states since 3 days ago he has been experiencing severe pain around the anus,  leading to the him having difficulty sitting.  He felt a bump near the anus.  Report fever to 102.4 at home.  Denies nausea vomiting, abdominal pain, chest pain, shortness of breath, headache.  Went to urgent care today and was told that he needs an I&D performed for possible anal abscess. Patient notes anal sex around 1 month ago.  Would like to be tested for STDs.  ctap shows 4.2 x 2.6 x 4.0 cm left perianal collection with surrounding inflammation. Pt started on clindamycin. General surgery performed bedside I&D. upon reassessment this morning, patient feels much improved. pain controlled. Feels ready for discharge. Down trend in Leukocytosis 20> 19. No fevers overnight. Spoke with Surg this morning, patient is cleared from there standpoint for outpatient follow up with Dr. Hernandez. patient is clinically well appearing and vitally stable for discharge home

## 2024-04-03 NOTE — ED CDU PROVIDER INITIAL DAY NOTE - PHYSICAL EXAMINATION
Vital signs reviewed.  CONSTITUTIONAL: NAD, awake  HEAD: Normocephalic; atraumatic  EYES: EOMI, no conjunctival injection, no scleral icterus  MOUTH/THROAT:  MMM  CV: Normal S1, S2; no audible murmurs; extremities WWP  RESP: normal work of breathing; CTAB   ABD: soft, non-distended; non-tender  : rectal exam showed approximately 5x5cm erythematous area postero-lateral to anus, indurated. White point near anus, possible fistula.   MSK/EXT: no edema, no limited ROM  SKIN: No rashes on exposed skin surfaces  NEURO: Moves all extremities spontaneously with no focal deficits, speech is appropriate

## 2024-04-03 NOTE — ED CDU PROVIDER INITIAL DAY NOTE - OBJECTIVE STATEMENT
28-year-old male with history of labral laminectomy  sent in by urgent care for possible I&D for anal abscess.  Patient states since 3 days ago he has been experiencing severe pain around the anus,  leading to the him having difficulty sitting.  He felt a bump near the anus.  Report fever to 102.4 at home.  Denies nausea vomiting, abdominal pain, chest pain, shortness of breath, headache.  Went to urgent care today and was told that he needs an I&D performed for possible anal abscess. Patient notes anal sex around 1 month ago.  Would like to be tested for STDs.  ctap shows 4.2 x 2.6 x 4.0 cm left perianal collection with surrounding inflammation. Pt started on clindamycin. General surgery performed bedside I&D. Plan to observe in CDU for pain control, continued IV abx and anti-pyretics prn.

## 2024-04-03 NOTE — ED CDU PROVIDER INITIAL DAY NOTE - CLINICAL SUMMARY MEDICAL DECISION MAKING FREE TEXT BOX
28-year-old male with history of labral laminectomy  sent in by urgent care for possible I&D for anal abscess.  Patient states since 3 days ago he has been experiencing severe pain around the anus,  leading to the him having difficulty sitting.  He felt a bump near the anus.  Report fever to 102.4 at home.  Denies nausea vomiting, abdominal pain, chest pain, shortness of breath, headache.  Went to urgent care today and was told that he needs an I&D performed for possible anal abscess. Patient notes anal sex around 1 month ago.  Would like to be tested for STDs.  ctap shows 4.2 x 2.6 x 4.0 cm left perianal collection with surrounding inflammation. Pt started on clindamycin. General surgery performed bedside I&D. Plan to observe in CDU for pain control, continued IV abx and anti-pyretics prn. 28-year-old male with history of labral laminectomy  sent in by urgent care for possible I&D for anal abscess.  Patient states since 3 days ago he has been experiencing severe pain around the anus,  leading to the him having difficulty sitting.  He felt a bump near the anus.  Report fever to 102.4 at home.  Denies nausea vomiting, abdominal pain, chest pain, shortness of breath, headache.  Went to urgent care today and was told that he needs an I&D performed for possible anal abscess. Patient notes anal sex around 1 month ago.  Would like to be tested for STDs.  ctap shows 4.2 x 2.6 x 4.0 cm left perianal collection with surrounding inflammation. Pt started on clindamycin. General surgery performed bedside I&D. Plan to observe in CDU for pain control, continued IV abx and anti-pyretics prn.    chante: Patient 28-year-old man otherwise healthy denies any other past medical history.  Has had perirectal pain for at least the last 3 days.  Nothing recently inserted into the rectum.  Last time the intercourse was about a month ago.  No history of an abscess previously in a perirectal or rectal area went to urgent care today found that he had an abscess that needed an I&D most likely was sent to the emergency department at home he had fever of 102.4.    Denies nausea vomiting diarrhea abdominal pain shortness of breath.    Blood pressure 126/77 respiratory rate 18 heart rate 99 temperature oral 98  Pt alert and can phonate well  h at/nc  perrl, conj clear, sclera anicteric,  neck supple  abd soft no r/g/t  Rectum on left buttock there is an area of remarkable tenderness and fluctuance in the vicinity of the anus but on rectal exam it does not extend into the anus.  ext no edema no deformities  neueo awake, lucid normal gait moves all extremities with strength  psych normal affect    Patient will have a CAT scan.  Area seems to be quite large had fever at home and doing a rectal temp here having lab work and will need medications and would start antibiotics as well

## 2024-04-03 NOTE — ED CDU PROVIDER DISPOSITION NOTE - PATIENT PORTAL LINK FT
You can access the FollowMyHealth Patient Portal offered by [facility] by registering at the following website: [email or web address]. By joining [employer]’s FollowMyHealth portal, you will also be able to view your health information using other applications (apps) compatible with our system.

## 2024-04-03 NOTE — ED CDU PROVIDER INITIAL DAY NOTE - ATTENDING APP SHARED VISIT CONTRIBUTION OF CARE
chante: Patient 28-year-old man otherwise healthy denies any other past medical history.  Has had perirectal pain for at least the last 3 days.  Nothing recently inserted into the rectum.  Last time the intercourse was about a month ago.  No history of an abscess previously in a perirectal or rectal area went to urgent care today found that he had an abscess that needed an I&D most likely was sent to the emergency department at home he had fever of 102.4.    Denies nausea vomiting diarrhea abdominal pain shortness of breath.    Blood pressure 126/77 respiratory rate 18 heart rate 99 temperature oral 98  Pt alert and can phonate well  h at/nc  perrl, conj clear, sclera anicteric,  neck supple  abd soft no r/g/t  Rectum on left buttock there is an area of remarkable tenderness and fluctuance in the vicinity of the anus but on rectal exam it does not extend into the anus.  ext no edema no deformities  neueo awake, lucid normal gait moves all extremities with strength  psych normal affect    Patient will have a CAT scan.  Area seems to be quite large had fever at home and doing a rectal temp here having lab work and will need medications and would start antibiotics as well

## 2024-04-03 NOTE — ED ADULT NURSE REASSESSMENT NOTE - NS ED NURSE REASSESS COMMENT FT1
Pt awake and alert, A&OX4, pt abscess sight looks clean and intact , no active bleeding noted. resp even and unlabored. Denies CP, SOB, HA, dizziness, palpitations, blurry vision. Resting comfortably.
pt remains a&ox4, c/o rectal pain. Pt denies chest pain, sob, n+v. Breathing even, unlabored. Pt medicated as per MAR. MD Montalvo at bedside

## 2024-04-03 NOTE — ED CDU PROVIDER DISPOSITION NOTE - CARE PROVIDER_API CALL
Bryan Hernandez  Surgery  [address] [place] [address], [address]  [place] [address]  Phone: [phone]  Fax: [phone]  Follow Up Time:

## 2024-04-03 NOTE — ED CDU PROVIDER DISPOSITION NOTE - ATTENDING CONTRIBUTION TO CARE
I (Peg) agree with above, I performed a history and physical. Counseled tiffani medical staff, physician assistant, and/or medical student on medical decision making as documented. Medical decisions and treatment interventions were made in real time during the patient encounter. Additionally and/or with the following exceptions:

## 2024-04-04 NOTE — ED POST DISCHARGE NOTE - REASON FOR FOLLOW-UP
Call from STEPHAN Olmedo to follow with patient regarding call she received on culture reuslts the gram stain. Pt is on clinda and improving. Is told to follow with Colorectal who did the I and D , states hes waiting for their call. I contacted the [place] [facility] and they will talk to patient and get them in touch to arrange follow up. Other

## 2024-04-08 LAB
CULTURE RESULTS: SIGNIFICANT CHANGE UP
CULTURE RESULTS: SIGNIFICANT CHANGE UP
SPECIMEN SOURCE: SIGNIFICANT CHANGE UP
SPECIMEN SOURCE: SIGNIFICANT CHANGE UP

## 2024-04-21 ENCOUNTER — EMERGENCY (EMERGENCY)
Facility: HOSPITAL | Age: 29
LOS: 1 days | Discharge: HOME | End: 2024-04-21
Attending: EMERGENCY MEDICINE | Admitting: EMERGENCY MEDICINE
Payer: COMMERCIAL

## 2024-04-21 VITALS
OXYGEN SATURATION: 98 % | RESPIRATION RATE: 17 BRPM | TEMPERATURE: 99 F | DIASTOLIC BLOOD PRESSURE: 94 MMHG | HEART RATE: 96 BPM | SYSTOLIC BLOOD PRESSURE: 153 MMHG

## 2024-04-21 DIAGNOSIS — Z98.890 OTHER SPECIFIED POSTPROCEDURAL STATES: Chronic | ICD-10-CM

## 2024-04-21 LAB
ALBUMIN SERPL ELPH-MCNC: 4.5 G/DL — SIGNIFICANT CHANGE UP (ref 3.3–5)
ALP SERPL-CCNC: 63 U/L — SIGNIFICANT CHANGE UP (ref 40–120)
ALT FLD-CCNC: 20 U/L — SIGNIFICANT CHANGE UP (ref 4–41)
ANION GAP SERPL CALC-SCNC: 12 MMOL/L — SIGNIFICANT CHANGE UP (ref 7–14)
APPEARANCE UR: CLEAR — SIGNIFICANT CHANGE UP
APTT BLD: 37.3 SEC — HIGH (ref 24.5–35.6)
AST SERPL-CCNC: 16 U/L — SIGNIFICANT CHANGE UP (ref 4–40)
BACTERIA # UR AUTO: ABNORMAL /HPF
BASOPHILS # BLD AUTO: 0.02 K/UL — SIGNIFICANT CHANGE UP (ref 0–0.2)
BASOPHILS NFR BLD AUTO: 0.1 % — SIGNIFICANT CHANGE UP (ref 0–2)
BILIRUB SERPL-MCNC: 0.7 MG/DL — SIGNIFICANT CHANGE UP (ref 0.2–1.2)
BILIRUB UR-MCNC: NEGATIVE — SIGNIFICANT CHANGE UP
BUN SERPL-MCNC: 11 MG/DL — SIGNIFICANT CHANGE UP (ref 7–23)
CALCIUM SERPL-MCNC: 9.4 MG/DL — SIGNIFICANT CHANGE UP (ref 8.4–10.5)
CAST: 0 /LPF — SIGNIFICANT CHANGE UP (ref 0–4)
CHLORIDE SERPL-SCNC: 104 MMOL/L — SIGNIFICANT CHANGE UP (ref 98–107)
CO2 SERPL-SCNC: 23 MMOL/L — SIGNIFICANT CHANGE UP (ref 22–31)
COLOR SPEC: YELLOW — SIGNIFICANT CHANGE UP
CREAT SERPL-MCNC: 0.84 MG/DL — SIGNIFICANT CHANGE UP (ref 0.5–1.3)
DIFF PNL FLD: NEGATIVE — SIGNIFICANT CHANGE UP
EGFR: 123 ML/MIN/1.73M2 — SIGNIFICANT CHANGE UP
EOSINOPHIL # BLD AUTO: 0.22 K/UL — SIGNIFICANT CHANGE UP (ref 0–0.5)
EOSINOPHIL NFR BLD AUTO: 1.4 % — SIGNIFICANT CHANGE UP (ref 0–6)
GLUCOSE SERPL-MCNC: 85 MG/DL — SIGNIFICANT CHANGE UP (ref 70–99)
GLUCOSE UR QL: NEGATIVE MG/DL — SIGNIFICANT CHANGE UP
HCT VFR BLD CALC: 42.7 % — SIGNIFICANT CHANGE UP (ref 39–50)
HGB BLD-MCNC: 14.2 G/DL — SIGNIFICANT CHANGE UP (ref 13–17)
IANC: 12.48 K/UL — HIGH (ref 1.8–7.4)
IMM GRANULOCYTES NFR BLD AUTO: 0.4 % — SIGNIFICANT CHANGE UP (ref 0–0.9)
INR BLD: 1.1 RATIO — SIGNIFICANT CHANGE UP (ref 0.85–1.18)
KETONES UR-MCNC: NEGATIVE MG/DL — SIGNIFICANT CHANGE UP
LACTATE SERPL-SCNC: 1.1 MMOL/L — SIGNIFICANT CHANGE UP (ref 0.5–2)
LEUKOCYTE ESTERASE UR-ACNC: ABNORMAL
LYMPHOCYTES # BLD AUTO: 14.9 % — SIGNIFICANT CHANGE UP (ref 13–44)
LYMPHOCYTES # BLD AUTO: 2.38 K/UL — SIGNIFICANT CHANGE UP (ref 1–3.3)
MCHC RBC-ENTMCNC: 28.2 PG — SIGNIFICANT CHANGE UP (ref 27–34)
MCHC RBC-ENTMCNC: 33.3 GM/DL — SIGNIFICANT CHANGE UP (ref 32–36)
MCV RBC AUTO: 84.9 FL — SIGNIFICANT CHANGE UP (ref 80–100)
MONOCYTES # BLD AUTO: 0.78 K/UL — SIGNIFICANT CHANGE UP (ref 0–0.9)
MONOCYTES NFR BLD AUTO: 4.9 % — SIGNIFICANT CHANGE UP (ref 2–14)
NEUTROPHILS # BLD AUTO: 12.48 K/UL — HIGH (ref 1.8–7.4)
NEUTROPHILS NFR BLD AUTO: 78.3 % — HIGH (ref 43–77)
NITRITE UR-MCNC: NEGATIVE — SIGNIFICANT CHANGE UP
NRBC # BLD: 0 /100 WBCS — SIGNIFICANT CHANGE UP (ref 0–0)
NRBC # FLD: 0 K/UL — SIGNIFICANT CHANGE UP (ref 0–0)
PH UR: 6 — SIGNIFICANT CHANGE UP (ref 5–8)
PLATELET # BLD AUTO: 264 K/UL — SIGNIFICANT CHANGE UP (ref 150–400)
POTASSIUM SERPL-MCNC: 3.8 MMOL/L — SIGNIFICANT CHANGE UP (ref 3.5–5.3)
POTASSIUM SERPL-SCNC: 3.8 MMOL/L — SIGNIFICANT CHANGE UP (ref 3.5–5.3)
PROT SERPL-MCNC: 7.7 G/DL — SIGNIFICANT CHANGE UP (ref 6–8.3)
PROT UR-MCNC: 30 MG/DL
PROTHROM AB SERPL-ACNC: 12.3 SEC — SIGNIFICANT CHANGE UP (ref 9.5–13)
RBC # BLD: 5.03 M/UL — SIGNIFICANT CHANGE UP (ref 4.2–5.8)
RBC # FLD: 13.9 % — SIGNIFICANT CHANGE UP (ref 10.3–14.5)
RBC CASTS # UR COMP ASSIST: 1 /HPF — SIGNIFICANT CHANGE UP (ref 0–4)
SODIUM SERPL-SCNC: 139 MMOL/L — SIGNIFICANT CHANGE UP (ref 135–145)
SP GR SPEC: 1.03 — SIGNIFICANT CHANGE UP (ref 1–1.03)
SQUAMOUS # UR AUTO: 1 /HPF — SIGNIFICANT CHANGE UP (ref 0–5)
UROBILINOGEN FLD QL: 1 MG/DL — SIGNIFICANT CHANGE UP (ref 0.2–1)
WBC # BLD: 15.94 K/UL — HIGH (ref 3.8–10.5)
WBC # FLD AUTO: 15.94 K/UL — HIGH (ref 3.8–10.5)
WBC UR QL: 8 /HPF — HIGH (ref 0–5)

## 2024-04-21 PROCEDURE — 74177 CT ABD & PELVIS W/CONTRAST: CPT | Mod: 26,MC

## 2024-04-21 PROCEDURE — 99285 EMERGENCY DEPT VISIT HI MDM: CPT

## 2024-04-21 RX ORDER — VANCOMYCIN HCL 1 G
1000 VIAL (EA) INTRAVENOUS ONCE
Refills: 0 | Status: COMPLETED | OUTPATIENT
Start: 2024-04-21 | End: 2024-04-21

## 2024-04-21 RX ORDER — KETOROLAC TROMETHAMINE 30 MG/ML
15 SYRINGE (ML) INJECTION ONCE
Refills: 0 | Status: DISCONTINUED | OUTPATIENT
Start: 2024-04-21 | End: 2024-04-21

## 2024-04-21 RX ORDER — PIPERACILLIN AND TAZOBACTAM 4; .5 G/20ML; G/20ML
3.38 INJECTION, POWDER, LYOPHILIZED, FOR SOLUTION INTRAVENOUS ONCE
Refills: 0 | Status: COMPLETED | OUTPATIENT
Start: 2024-04-21 | End: 2024-04-21

## 2024-04-21 RX ADMIN — Medication 15 MILLIGRAM(S): at 15:38

## 2024-04-21 RX ADMIN — Medication 250 MILLIGRAM(S): at 17:01

## 2024-04-21 RX ADMIN — PIPERACILLIN AND TAZOBACTAM 200 GRAM(S): 4; .5 INJECTION, POWDER, LYOPHILIZED, FOR SOLUTION INTRAVENOUS at 15:39

## 2024-04-21 NOTE — ED PROVIDER NOTE - PHYSICAL EXAMINATION
General: Alert and Orientated x 3. No apparent distress  Pulmonary: No increased WOB.   Abdominal: Soft, non-tender, non-distended  : Large perianal abscess draining pus  Neurologic: No focal sensory or motor deficits.  Skin: Color appropriate for race. Intact, warm, and well-perfused.  Psychiatric: Appropriate mood and affect. No apparent risk to self or others.

## 2024-04-21 NOTE — ED PROVIDER NOTE - CARE PROVIDER_API CALL
Bryan Hernandez  Surgery  [address] [place] [id number]  Phone: [phone]  Fax: [phone]  Follow Up Time:

## 2024-04-21 NOTE — ED PROVIDER NOTE - OBJECTIVE STATEMENT
27 y/o male recent CDU stay for perianal abscess presents with recurrence of abscess. He says after he got home the symptoms initially improved and he finished his course of clindamycin, but over the last week they have returned and become very painful. He believes the abscess began draining itself this afternoon when he got to the hospital. He denies fever/chills, chest pain, abdominal pain, dysuria/hematuria, n/v/d.

## 2024-04-21 NOTE — ED ADULT NURSE NOTE - CAS EDN DISCHARGE INTERVENTIONS
Final Anesthesia Post-op Assessment    Patient: Tracy Schwartz  Procedure(s) Performed: EXAM UNDER ANESTHESIA, PELVIS WITH BIOPSIES OF VULVA AND LOWER VAGINA  Anesthesia type: General    Vitals Value Taken Time   Temp 36.6 °C (97.8 °F) 12/2/2019  5:30 PM   Pulse 70 12/2/2019  5:30 PM   Resp 18 12/2/2019  5:30 PM   SpO2 98 % 12/2/2019  5:30 PM   /75 12/2/2019  5:30 PM       Last 24 I/O:     Intake/Output Summary (Last 24 hours) at 12/2/2019 1750  Last data filed at 12/2/2019 1734  Gross per 24 hour   Intake 525 ml   Output --   Net 525 ml         Patient Location: Phase II  Post-op Vital Signs:stable  Level of Consciousness: participates in exam, answers questions appropriately, awake, alert and oriented  Respiratory Status: spontaneous ventilation  Cardiovascular blood pressure returned to baseline and stable  Hydration: euvolemic  Pain Management: adequately controlled  Nausea: None  Airway Patency:patent  Post-op Assessment: awake, alert, appropriately conversant, or baseline, no complications, patient tolerated procedure well with no complications and evidence of recall       IV discontinued, cath removed intact

## 2024-04-21 NOTE — ED ADULT NURSE NOTE - OBJECTIVE STATEMENT
Break RN: Pt is a 27 y/o Male, A&Ox4, ambulatory with no reported PHx, presenting to the ED with c/o recurrence of perianal abscess. PT states he was recently discharged after treatment of abscess and finished course of antibiotics. Pt states abscess began draining while in waiting room. Neuro/sensory intact. Respirations even and unlabored, chest rise equal b/l. Pt denies chest pain, SOB, fever, cough, chills, abdominal pain, N/V/D, h/a, dizziness, numbness/tingling or any urinary symptoms at this time. No acute distress noted. 20g IVL placed in LAC. Labs collected and sent. Medication administered as ordered, see MAR. Safety maintained throughout.

## 2024-04-21 NOTE — ED PROVIDER NOTE - CLINICAL SUMMARY MEDICAL DECISION MAKING FREE TEXT BOX
29 y/o male recent CDU stay for perianal abscess presents with recurrence of abscess. He is hemodynamically stable, afebrile, on exam there is a large perianal abscess draining pus. Concern for recurrence/worsening of abscess, will get labs, give abx, CT AP to assess for internal spread of infection, consult surgery.

## 2024-04-21 NOTE — ED PROVIDER NOTE - NSFOLLOWUPINSTRUCTIONS_ED_ALL_ED_FT
Abscess    Your were seen in the Emergency Department today with a skin abscess.    A skin abscess is an infected area on or under your skin that contains a collection of pus and other material.  An abscess may also be called a furuncle, carbuncle, or boil.  It can occur on almost any part of your body.       Some abscesses open (rupture) on their own and drain.  Most continue to get worse unless they are treated.  The infection can spread deeper into the body and eventually into your blood, which can make you feel ill.  Treatment usually involves draining the abscess (and sometimes antibiotics).    General tips for taking care of an abscess:  -Take acetaminophen and/or ibuprofen for pain.  -If you were prescribed an antibiotic medicine, take it as told by your health care provider.  Do not stop taking the antibiotics even if you start to feel better.  -If you have an abscess that has not drained, apply heat to the affected area.  Use a moist heat pack or a heating pad.  Place a towel between your skin and the heat source, and try to leave the heat on for 20–30 minutes.  -If your abscess was drained, cover it with a bandage as instructed by your health care provider.  -Wash your hands with soap and water before you change the dressing or gauze.   -Check your abscess every day for signs of a worsening infection.   -To avoid spreading the infection: do not share personal care items, towels, or hot tubs with others, and avoid making skin contact with other people.    Follow up with your PCP in 1 week to make sure that you are doing better.    Return to the Emergency Department if you have:  -more redness, swelling, or pain around your abscess  -warm skin around your abscess  -more pus or a bad smell coming from your abscess even after several days  -a fever  -muscle aches  -chills or a general ill feeling

## 2024-04-21 NOTE — ED ADULT NURSE NOTE - NSFALLUNIVINTERV_ED_ALL_ED
Bed/Stretcher in lowest position, wheels locked, appropriate side rails in place/Call bell, personal items and telephone in reach/Instruct patient to call for assistance before getting out of bed/chair/stretcher/Non-slip footwear applied when patient is off stretcher/[place] to call system/Physically safe environment - no spills, clutter or unnecessary equipment/Purposeful proactive rounding/Room/bathroom lighting operational, light cord in reach

## 2024-04-21 NOTE — ED PROVIDER NOTE - PATIENT PORTAL LINK FT
You can access the FollowMyHealth Patient Portal offered by [facility] by registering at the following website: [email or web address]. By joining [employer]’s FollowMyHealth portal, you will also be able to view your health information using other applications (apps) compatible with our system.

## 2024-04-21 NOTE — ED PROVIDER NOTE - ATTENDING CONTRIBUTION TO CARE
28-year-old male with a recent CDU stay after incision and drainage for perianal abscess.  Patient was sent home on clindamycin.  Patient states that he was having improvement of symptoms however states over the past few days has noticed that the pain has worsened again.  Denies any fevers any chills any nausea any vomiting any chest pain any other abscesses or issues.  Patient states that it felt tense and it hurts to sit.  Patient states the abscess actually began to start draining again upon arrival to the emergency department today.  Will check basic labs we will repeat CT scan to ensure there is no residual abscess.  Plan to DC back on antibiotics with instructions for sitz bath.  Did speak with surgery who recommend outpatient follow-up.

## 2024-04-22 LAB
CULTURE RESULTS: SIGNIFICANT CHANGE UP
SPECIMEN SOURCE: SIGNIFICANT CHANGE UP

## 2024-04-23 LAB
C TRACH RRNA SPEC QL NAA+PROBE: SIGNIFICANT CHANGE UP
N GONORRHOEA RRNA SPEC QL NAA+PROBE: SIGNIFICANT CHANGE UP
SPECIMEN SOURCE: SIGNIFICANT CHANGE UP

## 2024-04-26 ENCOUNTER — EMERGENCY (EMERGENCY)
Facility: HOSPITAL | Age: 29
LOS: 0 days | Discharge: HOME | End: 2024-04-26
Attending: STUDENT IN AN ORGANIZED HEALTH CARE EDUCATION/TRAINING PROGRAM
Payer: COMMERCIAL

## 2024-04-26 VITALS
DIASTOLIC BLOOD PRESSURE: 90 MMHG | RESPIRATION RATE: 18 BRPM | SYSTOLIC BLOOD PRESSURE: 140 MMHG | OXYGEN SATURATION: 100 % | HEART RATE: 85 BPM | TEMPERATURE: 98 F

## 2024-04-26 VITALS
DIASTOLIC BLOOD PRESSURE: 100 MMHG | OXYGEN SATURATION: 98 % | RESPIRATION RATE: 16 BRPM | WEIGHT: 215.17 LBS | SYSTOLIC BLOOD PRESSURE: 150 MMHG | HEART RATE: 90 BPM

## 2024-04-26 DIAGNOSIS — I10 ESSENTIAL (PRIMARY) HYPERTENSION: ICD-10-CM

## 2024-04-26 DIAGNOSIS — R20.2 PARESTHESIA OF SKIN: ICD-10-CM

## 2024-04-26 DIAGNOSIS — R47.89 OTHER SPEECH DISTURBANCES: ICD-10-CM

## 2024-04-26 DIAGNOSIS — R51.9 HEADACHE, UNSPECIFIED: ICD-10-CM

## 2024-04-26 DIAGNOSIS — Z98.890 OTHER SPECIFIED POSTPROCEDURAL STATES: Chronic | ICD-10-CM

## 2024-04-26 LAB
ALBUMIN SERPL ELPH-MCNC: 3.5 G/DL — SIGNIFICANT CHANGE UP (ref 3.3–5)
ALP SERPL-CCNC: 55 U/L — SIGNIFICANT CHANGE UP (ref 40–120)
ALT FLD-CCNC: 37 U/L — SIGNIFICANT CHANGE UP (ref 12–78)
AMPHET UR-MCNC: NEGATIVE — SIGNIFICANT CHANGE UP
ANION GAP SERPL CALC-SCNC: 10 MMOL/L — SIGNIFICANT CHANGE UP (ref 5–17)
APTT BLD: 34.6 SEC — SIGNIFICANT CHANGE UP (ref 24.5–35.6)
AST SERPL-CCNC: 32 U/L — SIGNIFICANT CHANGE UP (ref 15–37)
BARBITURATES UR SCN-MCNC: NEGATIVE — SIGNIFICANT CHANGE UP
BASOPHILS # BLD AUTO: 0.02 K/UL — SIGNIFICANT CHANGE UP (ref 0–0.2)
BASOPHILS NFR BLD AUTO: 0.2 % — SIGNIFICANT CHANGE UP (ref 0–2)
BENZODIAZ UR-MCNC: NEGATIVE — SIGNIFICANT CHANGE UP
BILIRUB SERPL-MCNC: 0.8 MG/DL — SIGNIFICANT CHANGE UP (ref 0.2–1.2)
BUN SERPL-MCNC: 10 MG/DL — SIGNIFICANT CHANGE UP (ref 7–23)
CALCIUM SERPL-MCNC: 8.5 MG/DL — SIGNIFICANT CHANGE UP (ref 8.5–10.1)
CHLORIDE SERPL-SCNC: 99 MMOL/L — SIGNIFICANT CHANGE UP (ref 96–108)
CO2 SERPL-SCNC: 23 MMOL/L — SIGNIFICANT CHANGE UP (ref 22–31)
COCAINE METAB.OTHER UR-MCNC: NEGATIVE — SIGNIFICANT CHANGE UP
CREAT SERPL-MCNC: 0.87 MG/DL — SIGNIFICANT CHANGE UP (ref 0.5–1.3)
CULTURE RESULTS: SIGNIFICANT CHANGE UP
CULTURE RESULTS: SIGNIFICANT CHANGE UP
EGFR: 121 ML/MIN/1.73M2 — SIGNIFICANT CHANGE UP
EOSINOPHIL # BLD AUTO: 0.3 K/UL — SIGNIFICANT CHANGE UP (ref 0–0.5)
EOSINOPHIL NFR BLD AUTO: 3.5 % — SIGNIFICANT CHANGE UP (ref 0–6)
ETHANOL SERPL-MCNC: <10 MG/DL — SIGNIFICANT CHANGE UP (ref 0–10)
GLUCOSE SERPL-MCNC: 103 MG/DL — HIGH (ref 70–99)
HCT VFR BLD CALC: 41.8 % — SIGNIFICANT CHANGE UP (ref 39–50)
HGB BLD-MCNC: 13.8 G/DL — SIGNIFICANT CHANGE UP (ref 13–17)
IMM GRANULOCYTES NFR BLD AUTO: 0.2 % — SIGNIFICANT CHANGE UP (ref 0–0.9)
INR BLD: 1.01 RATIO — SIGNIFICANT CHANGE UP (ref 0.85–1.18)
LYMPHOCYTES # BLD AUTO: 2.41 K/UL — SIGNIFICANT CHANGE UP (ref 1–3.3)
LYMPHOCYTES # BLD AUTO: 28.2 % — SIGNIFICANT CHANGE UP (ref 13–44)
MCHC RBC-ENTMCNC: 28.4 PG — SIGNIFICANT CHANGE UP (ref 27–34)
MCHC RBC-ENTMCNC: 33 G/DL — SIGNIFICANT CHANGE UP (ref 32–36)
MCV RBC AUTO: 86 FL — SIGNIFICANT CHANGE UP (ref 80–100)
METHADONE UR-MCNC: NEGATIVE — SIGNIFICANT CHANGE UP
MONOCYTES # BLD AUTO: 0.52 K/UL — SIGNIFICANT CHANGE UP (ref 0–0.9)
MONOCYTES NFR BLD AUTO: 6.1 % — SIGNIFICANT CHANGE UP (ref 2–14)
NEUTROPHILS # BLD AUTO: 5.29 K/UL — SIGNIFICANT CHANGE UP (ref 1.8–7.4)
NEUTROPHILS NFR BLD AUTO: 61.8 % — SIGNIFICANT CHANGE UP (ref 43–77)
NRBC # BLD: 0 /100 WBCS — SIGNIFICANT CHANGE UP (ref 0–0)
OPIATES UR-MCNC: NEGATIVE — SIGNIFICANT CHANGE UP
PCP SPEC-MCNC: SIGNIFICANT CHANGE UP
PCP UR-MCNC: NEGATIVE — SIGNIFICANT CHANGE UP
PLATELET # BLD AUTO: 283 K/UL — SIGNIFICANT CHANGE UP (ref 150–400)
POTASSIUM SERPL-MCNC: 4 MMOL/L — SIGNIFICANT CHANGE UP (ref 3.5–5.3)
POTASSIUM SERPL-SCNC: 4 MMOL/L — SIGNIFICANT CHANGE UP (ref 3.5–5.3)
PROT SERPL-MCNC: 7.3 GM/DL — SIGNIFICANT CHANGE UP (ref 6–8.3)
PROTHROM AB SERPL-ACNC: 12 SEC — SIGNIFICANT CHANGE UP (ref 9.5–13)
RBC # BLD: 4.86 M/UL — SIGNIFICANT CHANGE UP (ref 4.2–5.8)
RBC # FLD: 13.8 % — SIGNIFICANT CHANGE UP (ref 10.3–14.5)
SODIUM SERPL-SCNC: 132 MMOL/L — LOW (ref 135–145)
SPECIMEN SOURCE: SIGNIFICANT CHANGE UP
SPECIMEN SOURCE: SIGNIFICANT CHANGE UP
THC UR QL: NEGATIVE — SIGNIFICANT CHANGE UP
TROPONIN I, HIGH SENSITIVITY RESULT: 3.3 NG/L — SIGNIFICANT CHANGE UP
WBC # BLD: 8.56 K/UL — SIGNIFICANT CHANGE UP (ref 3.8–10.5)
WBC # FLD AUTO: 8.56 K/UL — SIGNIFICANT CHANGE UP (ref 3.8–10.5)

## 2024-04-26 PROCEDURE — 70498 CT ANGIOGRAPHY NECK: CPT | Mod: 26,MC

## 2024-04-26 PROCEDURE — 0042T: CPT

## 2024-04-26 PROCEDURE — 99285 EMERGENCY DEPT VISIT HI MDM: CPT

## 2024-04-26 PROCEDURE — 70496 CT ANGIOGRAPHY HEAD: CPT | Mod: 26,MC

## 2024-04-26 PROCEDURE — 70450 CT HEAD/BRAIN W/O DYE: CPT | Mod: 26,59,MC

## 2024-04-26 PROCEDURE — 93010 ELECTROCARDIOGRAM REPORT: CPT

## 2024-04-26 RX ORDER — AMLODIPINE BESYLATE 2.5 MG/1
1 TABLET ORAL
Qty: 30 | Refills: 0
Start: 2024-04-26 | End: 2024-05-25

## 2024-04-26 RX ORDER — AMLODIPINE BESYLATE 2.5 MG/1
5 TABLET ORAL ONCE
Refills: 0 | Status: COMPLETED | OUTPATIENT
Start: 2024-04-26 | End: 2024-04-26

## 2024-04-26 RX ORDER — SODIUM CHLORIDE 9 MG/ML
1000 INJECTION INTRAMUSCULAR; INTRAVENOUS; SUBCUTANEOUS ONCE
Refills: 0 | Status: COMPLETED | OUTPATIENT
Start: 2024-04-26 | End: 2024-04-26

## 2024-04-26 RX ORDER — METOCLOPRAMIDE HCL 10 MG
10 TABLET ORAL ONCE
Refills: 0 | Status: COMPLETED | OUTPATIENT
Start: 2024-04-26 | End: 2024-04-26

## 2024-04-26 RX ADMIN — SODIUM CHLORIDE 1000 MILLILITER(S): 9 INJECTION INTRAMUSCULAR; INTRAVENOUS; SUBCUTANEOUS at 19:30

## 2024-04-26 RX ADMIN — Medication 10 MILLIGRAM(S): at 19:31

## 2024-04-26 RX ADMIN — AMLODIPINE BESYLATE 5 MILLIGRAM(S): 2.5 TABLET ORAL at 22:49

## 2024-04-26 NOTE — PHARMACOTHERAPY INTERVENTION NOTE - COMMENTS
Code Stroke called for patient. Pharmacy at bedside with ED team to assess patient. No deficits noted on exam except slower speech per mom at bedside, and symptoms resolving at arrival in ED. Tenecteplase not indicated as patient's last known normal was today 4/26 ~1200 so patient is out of window.     Of note, patient had history of hypertension diagnosed 2 years ago, but patient is non-adherent to medication and only takes "once in a while". BP at triage 150/100 mmHg, HR 90 bpm.

## 2024-04-26 NOTE — ED PROVIDER NOTE - PHYSICAL EXAMINATION
Gen: NAD, AOx3, able to make needs known, non-toxic  Head: NCAT  HEENT: EOMI, oral mucosa moist, normal conjunctiva  Lung: no respiratory distress, CTAB, no wheezes/rhonchi/rales B/L, speaking in full sentences  CV: RRR, no murmurs  Abd: non distended, soft, nontender, no guarding, no CVA tenderness  MSK: no visible deformities  Neuro: Appears non focal. speech clear. Cn 2-12 grossly intact b/l. see NIHSS below  Skin: Warm, well perfused  Psych: normal affect

## 2024-04-26 NOTE — ED ADULT TRIAGE NOTE - CHIEF COMPLAINT QUOTE
BIBA from home for left sided tingling , left facial droop, slurred speech, hypertension, last seen normal 1200 today, as per emt patient was diagnosed with HTN 2 years ago, non compliant with meds, initial BP was 200/100, as per mom, patient speech still sound slurred, no facial droop noted, no upper and lower extremity drift noted, patient able to ambulate, complaining of mild dizziness

## 2024-04-26 NOTE — ED PROVIDER NOTE - OBJECTIVE STATEMENT
29 y/o M w/ PMH of HTN (not on meds) presenting w/ L sided arm tingling. Seen w/ mother. BIBEMS. Reports around 12:30 pm developed L sided arm tingling, L sided headache. Had called mother and mother reports speech seemed slurred. Went to pt and felt like the left side of his face was drooping. Reports symptoms have since improved. Still feeling left sided headache and mother feels like speech is not back to normal. Denies fevers, chills, dizziness, blurred vision, chest pain, cough, shortness of breath, abdominal pain, n/v/d/c, urinary symptoms, MSK pain, rash.

## 2024-04-26 NOTE — ED PROVIDER NOTE - NSFOLLOWUPINSTRUCTIONS_ED_ALL_ED_FT
Your labs and imaging did not show any emergent findings (no evidence of stroke on CT scans). Your blood pressure remained high so you were started on blood pressure medication.    1) Follow up with your doctor. Please call Monday morning to arrange a follow up appointment. You were provided with information for the neurologist to follow up with as well  2) Return to the ED immediately for new or worsening symptoms  3) Please continue to take any home medications as prescribed  4) Your test results from your ED visit were discussed with you prior to discharge  5) You were provided with a copy of your test results  6) Please  your medication at the pharmacy and take as directed    Headache    A headache is pain or discomfort felt around the head or neck area. The specific cause of a headache may not be found as there are many types including tension headaches, migraine headaches, and cluster headaches. Watch your condition for any changes. Things you can do to manage your pain include taking over the counter and prescription medications as instructed by your health care provider, lying down in a dark quiet room, limiting stress, getting regular sleep, and refraining from alcohol and tobacco products.    SEEK IMMEDIATE MEDICAL CARE IF YOU HAVE ANY OF THE FOLLOWING SYMPTOMS: fever, vomiting, stiff neck, loss of vision, problems with speech, muscle weakness, loss of balance, trouble walking, passing out, or confusion.    Hypertension    Hypertension, commonly called high blood pressure, is when the force of blood pumping through your arteries is too strong. Hypertension forces your heart to work harder to pump blood. Your arteries may become narrow or stiff. Having untreated or uncontrolled hypertension for a long period of time can cause heart attack, stroke, kidney disease, and other problems. If started on a medication, take exactly as prescribed by your health care professional. Maintain a healthy lifestyle and follow up with your primary care physician.    SEEK IMMEDIATE MEDICAL CARE IF YOU HAVE ANY OF THE FOLLOWING SYMPTOMS: severe headache, confusion, chest pain, abdominal pain, vomiting, or shortness of breath.

## 2024-04-26 NOTE — ED PROVIDER NOTE - CLINICAL SUMMARY MEDICAL DECISION MAKING FREE TEXT BOX
27 y/o M w/ PMH of HTN (not on meds) presenting w/ L sided arm tingling. Seen w/ mother. BIBEMS. Reports around 12:30 pm developed L sided arm tingling, L sided headache. Had called mother and mother reports speech seemed slurred. Went to pt and felt like the left side of his face was drooping. Reports symptoms have since improved. Still feeling left sided headache and mother feels like speech is not back to normal. Denies fevers, chills, dizziness, blurred vision, chest pain, cough, shortness of breath, abdominal pain, n/v/d/c, urinary symptoms, MSK pain, rash. Pt code stroke at triage given symptoms, however exam reassuring and speech does sound clear (but mother reporting change in speech). Eval for metabolic abnormalities. W/ L sided headache as well, possible atypical migraine. Plan for labs, imaging, EKG, meds. Will reassess the need for additional interventions as clinically warranted. Refer to any progress notes for updates on clinical course and as a continuation of this MDM.

## 2024-04-26 NOTE — ED PROVIDER NOTE - PATIENT PORTAL LINK FT
You can access the FollowMyHealth Patient Portal offered by [facility] by registering at the following website: [email or web address]. By joining [email or web address] FollowMyHealth portal, you will also be able to view your health information using other applications (apps) compatible with our system.

## 2024-04-26 NOTE — ED ADULT NURSE NOTE - NS ED NURSE DC PT EDUCATION RESOURCES
[Follow - Up] : a follow-up visit [DM Type 2] : DM Type 2 [Hypothyroidism] : hypothyroidism [Thyroid nodule/ MNG] : thyroid nodule/ MNG [Weight Management/Obesity] : weight management/obesity Yes

## 2024-04-26 NOTE — ED ADULT NURSE NOTE - NSFALLRISKINTERV_ED_ALL_ED

## 2024-04-26 NOTE — ED ADULT NURSE NOTE - CAS EDN DISCHARGE ASSESSMENT
no neurological deficits/Alert and oriented to person, place and time/Patient baseline mental status/Awake

## 2024-04-26 NOTE — ED PROVIDER NOTE - CARE PROVIDER_API CALL
Jacques Roy)  Neurology  [address], [address]  [place] [address]  Phone: [phone]  Fax: [phone]  Follow Up Time:

## 2024-04-26 NOTE — ED PROVIDER NOTE - PROGRESS NOTE DETAILS
Attending Erick: labs and imaging w/ no emergent findings. informed pt, mother, godmother (over the phone). pt reports symptoms improved. BP improved, but still trending high. was previously prescribed medication 2 years ago, but never took it. given that, will start on low dose amlodipine and have pt f/u w/ PCP. pt and family agreeable w/ plan. Ready for DC.

## 2024-04-26 NOTE — ED PROVIDER NOTE - NSFOLLOWUPCLINICS_GEN_ALL_ED_FT
[facility]  Neurology  [address] [place] [address] [address] [place] [address]  Phone: [phone]  Fax:

## 2024-04-26 NOTE — ED ADULT NURSE REASSESSMENT NOTE - NS ED NURSE REASSESS COMMENT FT1
Hand off report received by Sintia BURDICK. Pt aaox3, speech clear, moves all 4 extremities. No neurological deficits noted. Pt in NAD at this time

## 2024-05-08 ENCOUNTER — APPOINTMENT (OUTPATIENT)
Dept: SURGERY | Facility: CLINIC | Age: 29
End: 2024-05-08

## 2024-05-10 NOTE — HISTORY OF PRESENT ILLNESS
[FreeTextEntry1] : Nicholas is a 29 y/o male being seen for consultation, perianal abscess  Never had a Colonoscopy.  H/O IBD  ED 4/2/24 [facility] s/p I&D perianal abscess by Dr. Bryan Hernandez at bedside (went to Urgent Care first for bump near the anus and fever).  Pt started received clindamycin.    CT A/p showed 4.2 x 2.6 x 4.0 cm left perianal collection with surrounding inflammation. More detailed evaluation with contrast-enhanced MRI may be obtained, as clinically warranted, if no contraindications exist.  ED 4/21/24 [facility] 4/21/24 - c/o recurrence of abscess after finishing course of ABX.  Drain from the abscess and pain.    CT A/P 4/21/24 - Status post incision and drainage of the previously seen perianal abscess with persistent skin thickening and induration at the perianal soft tissues. Findings may represent residual inflammation or cellulitis. No discrete sizable abscess is visualized.  A nonemergent outpatient MRI is recommended for further characterization and to evaluate for perianal fistula.